# Patient Record
Sex: FEMALE | Race: WHITE | Employment: FULL TIME | URBAN - METROPOLITAN AREA
[De-identification: names, ages, dates, MRNs, and addresses within clinical notes are randomized per-mention and may not be internally consistent; named-entity substitution may affect disease eponyms.]

---

## 2023-01-13 ENCOUNTER — HOSPITAL ENCOUNTER (EMERGENCY)
Age: 28
Discharge: HOME | End: 2023-01-13
Payer: COMMERCIAL

## 2023-01-13 ENCOUNTER — HOSPITAL ENCOUNTER (EMERGENCY)
Age: 28
Discharge: HOME | End: 2023-01-13
Attending: STUDENT IN AN ORGANIZED HEALTH CARE EDUCATION/TRAINING PROGRAM
Payer: COMMERCIAL

## 2023-01-13 ENCOUNTER — APPOINTMENT (OUTPATIENT)
Dept: GENERAL RADIOLOGY | Age: 28
End: 2023-01-13
Payer: COMMERCIAL

## 2023-01-13 VITALS
TEMPERATURE: 98.6 F | WEIGHT: 185 LBS | OXYGEN SATURATION: 100 % | HEART RATE: 77 BPM | BODY MASS INDEX: 29.03 KG/M2 | HEIGHT: 67 IN | RESPIRATION RATE: 20 BRPM | SYSTOLIC BLOOD PRESSURE: 134 MMHG | DIASTOLIC BLOOD PRESSURE: 91 MMHG

## 2023-01-13 DIAGNOSIS — R11.2 NAUSEA AND VOMITING, UNSPECIFIED VOMITING TYPE: Primary | ICD-10-CM

## 2023-01-13 DIAGNOSIS — E86.0 DEHYDRATION: ICD-10-CM

## 2023-01-13 LAB
ALBUMIN SERPL-MCNC: 4.8 G/DL (ref 3.4–5)
ALP BLD-CCNC: 69 U/L (ref 40–129)
ALT SERPL-CCNC: 25 U/L (ref 10–40)
ANION GAP SERPL CALCULATED.3IONS-SCNC: 16 MMOL/L (ref 3–16)
AST SERPL-CCNC: 28 U/L (ref 15–37)
BASOPHILS ABSOLUTE: 0 K/UL (ref 0–0.2)
BASOPHILS RELATIVE PERCENT: 0.2 %
BILIRUB SERPL-MCNC: 1.3 MG/DL (ref 0–1)
BILIRUBIN DIRECT: <0.2 MG/DL (ref 0–0.3)
BILIRUBIN URINE: NEGATIVE
BILIRUBIN, INDIRECT: ABNORMAL MG/DL (ref 0–1)
BLOOD, URINE: NEGATIVE
BUN BLDV-MCNC: 11 MG/DL (ref 7–20)
CALCIUM SERPL-MCNC: 10 MG/DL (ref 8.3–10.6)
CHLORIDE BLD-SCNC: 97 MMOL/L (ref 99–110)
CLARITY: CLEAR
CO2: 20 MMOL/L (ref 21–32)
COLOR: YELLOW
CREAT SERPL-MCNC: 0.7 MG/DL (ref 0.6–1.1)
EOSINOPHILS ABSOLUTE: 0 K/UL (ref 0–0.6)
EOSINOPHILS RELATIVE PERCENT: 0 %
GFR SERPL CREATININE-BSD FRML MDRD: >60 ML/MIN/{1.73_M2}
GLUCOSE BLD-MCNC: 108 MG/DL (ref 70–99)
GLUCOSE URINE: NEGATIVE MG/DL
HCG QUALITATIVE: NEGATIVE
HCT VFR BLD CALC: 42.4 % (ref 36–48)
HEMOGLOBIN: 15.3 G/DL (ref 12–16)
KETONES, URINE: >=80 MG/DL
LACTIC ACID: 1.4 MMOL/L (ref 0.4–2)
LEUKOCYTE ESTERASE, URINE: NEGATIVE
LIPASE: 19 U/L (ref 13–60)
LYMPHOCYTES ABSOLUTE: 1.1 K/UL (ref 1–5.1)
LYMPHOCYTES RELATIVE PERCENT: 10.4 %
MCH RBC QN AUTO: 31.8 PG (ref 26–34)
MCHC RBC AUTO-ENTMCNC: 36.1 G/DL (ref 31–36)
MCV RBC AUTO: 88.2 FL (ref 80–100)
MICROSCOPIC EXAMINATION: ABNORMAL
MONOCYTES ABSOLUTE: 0.4 K/UL (ref 0–1.3)
MONOCYTES RELATIVE PERCENT: 4.3 %
NEUTROPHILS ABSOLUTE: 8.9 K/UL (ref 1.7–7.7)
NEUTROPHILS RELATIVE PERCENT: 85.1 %
NITRITE, URINE: NEGATIVE
PDW BLD-RTO: 13.1 % (ref 12.4–15.4)
PH UA: 7 (ref 5–8)
PLATELET # BLD: 320 K/UL (ref 135–450)
PMV BLD AUTO: 8.1 FL (ref 5–10.5)
POTASSIUM SERPL-SCNC: 3.5 MMOL/L (ref 3.5–5.1)
PROTEIN UA: NEGATIVE MG/DL
RAPID INFLUENZA  B AGN: NEGATIVE
RAPID INFLUENZA A AGN: NEGATIVE
RBC # BLD: 4.8 M/UL (ref 4–5.2)
SARS-COV-2, NAAT: NOT DETECTED
SODIUM BLD-SCNC: 133 MMOL/L (ref 136–145)
SPECIFIC GRAVITY UA: <=1.005 (ref 1–1.03)
TOTAL PROTEIN: 7.6 G/DL (ref 6.4–8.2)
URINE REFLEX TO CULTURE: ABNORMAL
URINE TYPE: ABNORMAL
UROBILINOGEN, URINE: 0.2 E.U./DL
WBC # BLD: 10.5 K/UL (ref 4–11)

## 2023-01-13 PROCEDURE — 84703 CHORIONIC GONADOTROPIN ASSAY: CPT

## 2023-01-13 PROCEDURE — 6360000002 HC RX W HCPCS

## 2023-01-13 PROCEDURE — 6370000000 HC RX 637 (ALT 250 FOR IP)

## 2023-01-13 PROCEDURE — 36415 COLL VENOUS BLD VENIPUNCTURE: CPT

## 2023-01-13 PROCEDURE — 99284 EMERGENCY DEPT VISIT MOD MDM: CPT

## 2023-01-13 PROCEDURE — 83605 ASSAY OF LACTIC ACID: CPT

## 2023-01-13 PROCEDURE — 71045 X-RAY EXAM CHEST 1 VIEW: CPT

## 2023-01-13 PROCEDURE — 83690 ASSAY OF LIPASE: CPT

## 2023-01-13 PROCEDURE — 81003 URINALYSIS AUTO W/O SCOPE: CPT

## 2023-01-13 PROCEDURE — 87804 INFLUENZA ASSAY W/OPTIC: CPT

## 2023-01-13 PROCEDURE — 80076 HEPATIC FUNCTION PANEL: CPT

## 2023-01-13 PROCEDURE — 80048 BASIC METABOLIC PNL TOTAL CA: CPT

## 2023-01-13 PROCEDURE — 96375 TX/PRO/DX INJ NEW DRUG ADDON: CPT

## 2023-01-13 PROCEDURE — 96374 THER/PROPH/DIAG INJ IV PUSH: CPT

## 2023-01-13 PROCEDURE — 85025 COMPLETE CBC W/AUTO DIFF WBC: CPT

## 2023-01-13 PROCEDURE — 87635 SARS-COV-2 COVID-19 AMP PRB: CPT

## 2023-01-13 PROCEDURE — 6360000002 HC RX W HCPCS: Performed by: PHYSICIAN ASSISTANT

## 2023-01-13 RX ORDER — SODIUM CHLORIDE, SODIUM LACTATE, POTASSIUM CHLORIDE, AND CALCIUM CHLORIDE .6; .31; .03; .02 G/100ML; G/100ML; G/100ML; G/100ML
1000 INJECTION, SOLUTION INTRAVENOUS ONCE
Status: DISCONTINUED | OUTPATIENT
Start: 2023-01-13 | End: 2023-01-13 | Stop reason: HOSPADM

## 2023-01-13 RX ORDER — ACETAMINOPHEN 500 MG
1000 TABLET ORAL
Status: COMPLETED | OUTPATIENT
Start: 2023-01-13 | End: 2023-01-13

## 2023-01-13 RX ORDER — PROCHLORPERAZINE EDISYLATE 5 MG/ML
10 INJECTION INTRAMUSCULAR; INTRAVENOUS ONCE
Status: COMPLETED | OUTPATIENT
Start: 2023-01-13 | End: 2023-01-13

## 2023-01-13 RX ORDER — ONDANSETRON 2 MG/ML
4 INJECTION INTRAMUSCULAR; INTRAVENOUS ONCE
Status: COMPLETED | OUTPATIENT
Start: 2023-01-13 | End: 2023-01-13

## 2023-01-13 RX ORDER — 0.9 % SODIUM CHLORIDE 0.9 %
1000 INTRAVENOUS SOLUTION INTRAVENOUS ONCE
Status: DISCONTINUED | OUTPATIENT
Start: 2023-01-13 | End: 2023-01-13 | Stop reason: HOSPADM

## 2023-01-13 RX ORDER — ONDANSETRON 4 MG/1
4 TABLET, FILM COATED ORAL EVERY 8 HOURS PRN
Qty: 20 TABLET | Refills: 0 | Status: SHIPPED | OUTPATIENT
Start: 2023-01-13

## 2023-01-13 RX ORDER — BUPROPION HYDROCHLORIDE 300 MG/1
300 TABLET ORAL EVERY MORNING
COMMUNITY

## 2023-01-13 RX ORDER — HYDROXYZINE HYDROCHLORIDE 10 MG/1
10 TABLET, FILM COATED ORAL ONCE
Status: DISCONTINUED | OUTPATIENT
Start: 2023-01-13 | End: 2023-01-13

## 2023-01-13 RX ADMIN — PROCHLORPERAZINE EDISYLATE 10 MG: 5 INJECTION, SOLUTION INTRAMUSCULAR; INTRAVENOUS at 11:38

## 2023-01-13 RX ADMIN — ONDANSETRON 4 MG: 2 INJECTION INTRAMUSCULAR; INTRAVENOUS at 09:28

## 2023-01-13 RX ADMIN — SODIUM CHLORIDE, SODIUM LACTATE, POTASSIUM CHLORIDE, AND CALCIUM CHLORIDE 1000 ML: .6; .31; .03; .02 INJECTION, SOLUTION INTRAVENOUS at 11:46

## 2023-01-13 RX ADMIN — ACETAMINOPHEN 1000 MG: 500 TABLET ORAL at 11:38

## 2023-01-13 ASSESSMENT — PAIN DESCRIPTION - PAIN TYPE
TYPE: ACUTE PAIN
TYPE: ACUTE PAIN

## 2023-01-13 ASSESSMENT — PAIN DESCRIPTION - LOCATION
LOCATION: ABDOMEN;GENERALIZED
LOCATION: GENERALIZED

## 2023-01-13 ASSESSMENT — PAIN - FUNCTIONAL ASSESSMENT
PAIN_FUNCTIONAL_ASSESSMENT: PREVENTS OR INTERFERES SOME ACTIVE ACTIVITIES AND ADLS
PAIN_FUNCTIONAL_ASSESSMENT: 0-10
PAIN_FUNCTIONAL_ASSESSMENT: PREVENTS OR INTERFERES SOME ACTIVE ACTIVITIES AND ADLS

## 2023-01-13 ASSESSMENT — PAIN DESCRIPTION - DESCRIPTORS
DESCRIPTORS: ACHING;BURNING
DESCRIPTORS: ACHING

## 2023-01-13 ASSESSMENT — PAIN DESCRIPTION - FREQUENCY
FREQUENCY: CONTINUOUS
FREQUENCY: CONTINUOUS

## 2023-01-13 ASSESSMENT — PAIN SCALES - GENERAL
PAINLEVEL_OUTOF10: 9
PAINLEVEL_OUTOF10: 8

## 2023-01-13 ASSESSMENT — PAIN DESCRIPTION - ONSET
ONSET: ON-GOING
ONSET: ON-GOING

## 2023-01-13 NOTE — ED PROVIDER NOTES
[address] [facility]          EM RESIDENT NOTE       Date of evaluation: 1/13/2023    Chief Complaint     Emesis (For 3 days) and Generalized Body Aches      History of Present Illness     Huong Crawford is a previously healthy 32 y.o. female who presents for evaluation of emesis and myalgias. The patient reports that for the past three days she has been experiencing severe nausea and vomiting, to the point that she cannot tolerate any PO intake. She also endorses associated myalgias, diffuse abdominal pain, anxiety, which she says manifests as chest tightness and shortness of breath. She denies lamine chest pain, fevers, chills, hematemesis, diarrhea, constipation, urinary symptoms, and daily marijuana use. MEDICAL DECISION MAKING / ASSESSMENT / PLAN     INITIAL VITALS: BP: (!) 141/101, Temp: 97.8 °F (36.6 °C), Heart Rate: 85, Resp: 20, SpO2: 100 %    Huong Crawford is a 32 y.o. female presenting for evaluation and treatment of nausea and vomiting. She was dry in exam, and reported copious vomiting over the past 3 days. However, her laboratory studies were surprisingly normal, with no white count, normal lactate, and an unremarkable BMP. Lipase and LFTs did not show any gross abnormalities; urinalysis was positive only for ketones. The patient was given Zofran in the waiting room, which did improve her symptoms for approximately 1 hour. However, her nausea and vomiting then recurred. I opted to try her with a dose of IV Compazine, along with 1 L of IV fluids. These medications resulted in a significant improvement. On reassessment, she no longer had any abdominal tenderness, and I did not think that she required abdominal imaging at this time. We did have a long discussion about the possibility of an evolving illness course, with her presentation signifying early appendicitis or other intra-abdominal pathology.   I explained the importance of returning should her symptoms recur or worsen, and she understood. She was able to tolerate p.o., and felt comfortable being discharged home. I suspect that her symptoms are due to a viral gastritis, and that she will continue to improve at home. She was discharged in good condition, with strict return precautions. Medical Decision Making  Amount and/or Complexity of Data Reviewed  Labs: ordered. Decision-making details documented in ED Course. Risk  OTC drugs. Prescription drug management. This patient was also evaluated by the attending physician. All care plans werediscussed and agreed upon. Clinical Impression     1. Nausea and vomiting, unspecified vomiting type    2. Dehydration        Disposition     PATIENT REFERRED TO:  No follow-up provider specified.     DISCHARGE MEDICATIONS:  Discharge Medication List as of 1/13/2023  3:05 PM        START taking these medications    Details   ondansetron (ZOFRAN) 4 MG tablet Take 1 tablet by mouth every 8 hours as needed for Nausea, Disp-20 tablet, R-0Normal             DISPOSITION Decision To Discharge 01/13/2023 03:42:32 PM    Diagnostic Results and Other Data     RADIOLOGY:  No orders to display       LABS:   Results for orders placed or performed during the hospital encounter of 01/13/23   COVID-19, Rapid    Specimen: Nasopharyngeal Swab   Result Value Ref Range    SARS-CoV-2, NAAT Not Detected Not Detected   Rapid Flu Swab    Specimen: Nasopharyngeal   Result Value Ref Range    Rapid Influenza A Ag Negative Negative    Rapid Influenza B Ag Negative Negative   CBC with Auto Differential   Result Value Ref Range    WBC 10.5 4.0 - 11.0 K/uL    RBC 4.80 4.00 - 5.20 M/uL    Hemoglobin 15.3 12.0 - 16.0 g/dL    Hematocrit 42.4 36.0 - 48.0 %    MCV 88.2 80.0 - 100.0 fL    MCH 31.8 26.0 - 34.0 pg    MCHC 36.1 (H) 31.0 - 36.0 g/dL    RDW 13.1 12.4 - 15.4 %    Platelets 216 387 - 121 K/uL    MPV 8.1 5.0 - 10.5 fL    Neutrophils % 85.1 %    Lymphocytes % 10.4 %    Monocytes % 4.3 %    Eosinophils % 0.0 %    Basophils % 0.2 %    Neutrophils Absolute 8.9 (H) 1.7 - 7.7 K/uL    Lymphocytes Absolute 1.1 1.0 - 5.1 K/uL    Monocytes Absolute 0.4 0.0 - 1.3 K/uL    Eosinophils Absolute 0.0 0.0 - 0.6 K/uL    Basophils Absolute 0.0 0.0 - 0.2 K/uL   Basic Metabolic Panel   Result Value Ref Range    Sodium 133 (L) 136 - 145 mmol/L    Potassium 3.5 3.5 - 5.1 mmol/L    Chloride 97 (L) 99 - 110 mmol/L    CO2 20 (L) 21 - 32 mmol/L    Anion Gap 16 3 - 16    Glucose 108 (H) 70 - 99 mg/dL    BUN 11 7 - 20 mg/dL    Creatinine 0.7 0.6 - 1.1 mg/dL    Est, Glom Filt Rate >60 >60    Calcium 10.0 8.3 - 10.6 mg/dL   Lactic Acid   Result Value Ref Range    Lactic Acid 1.4 0.4 - 2.0 mmol/L   Urinalysis with Reflex to Culture    Specimen: Urine, clean catch   Result Value Ref Range    Color, UA Yellow Straw/Yellow    Clarity, UA Clear Clear    Glucose, Ur Negative Negative mg/dL    Bilirubin Urine Negative Negative    Ketones, Urine >=80 (A) Negative mg/dL    Specific Gravity, UA <=1.005 1.005 - 1.030    Blood, Urine Negative Negative    pH, UA 7.0 5.0 - 8.0    Protein, UA Negative Negative mg/dL    Urobilinogen, Urine 0.2 <2.0 E.U./dL    Nitrite, Urine Negative Negative    Leukocyte Esterase, Urine Negative Negative    Microscopic Examination Not Indicated     Urine Type NotGiven     Urine Reflex to Culture Not Indicated    HCG Qualitative, Serum   Result Value Ref Range    hCG Qual Negative Detects HCG level >10 MIU/mL   Lipase   Result Value Ref Range    Lipase 19.0 13.0 - 60.0 U/L   Hepatic Function Panel   Result Value Ref Range    Total Protein 7.6 6.4 - 8.2 g/dL    Albumin 4.8 3.4 - 5.0 g/dL    Alkaline Phosphatase 69 40 - 129 U/L    ALT 25 10 - 40 U/L    AST 28 15 - 37 U/L    Total Bilirubin 1.3 (H) 0.0 - 1.0 mg/dL    Bilirubin, Direct <0.2 0.0 - 0.3 mg/dL    Bilirubin, Indirect see below 0.0 - 1.0 mg/dL     ED BEDSIDE ULTRASOUND:  No results found.     RECENT VITALS:  BP: (!) 134/91, Temp: 98.6 °F (37 °C), Heart Rate: 77,Resp: 20, SpO2: 100 %     ED Course     Nursing Notes, Past Medical Hx, Past Surgical Hx, Social Hx, Allergies, and Family Hx were reviewed. ED Course as of 01/13/23 1801 Fri Jan 13, 2023   1127 SARS-CoV-2, NAAT: Not Detected [ED]   1127 Rapid Influenza A Ag: Negative [ED]   1127 hCG Qual: Negative [ED]   1127 Lactic Acid: 1.4 [ED]   1127 Creatinine: 0.7 [ED]   1127 WBC: 10.5 [ED]   1354 Ketones, Urine(!): >=80 [ED]   1354 Urinalysis with Reflex to Culture(!):    Color, UA Yellow   Clarity, UA Clear   Glucose, UA Negative   Bilirubin, Urine Negative   Ketones, Urine >=80(!)   Specific Gravity, UA <=1.005   Blood, Urine Negative   pH, UA 7.0   Protein, UA Negative   Urobilinogen, Urine 0.2   Nitrite, Urine Negative   Leukocyte Esterase, Urine Negative   Microscopic Examination Not Indicated   Urine Type NotGiven   Urine Reflex to Culture Not Indicated  Negative for UTI [ED]      ED Course User Index  [ED] Vahid Montague MD       The patient was given the following medications:  Orders Placed This Encounter   Medications    ondansetron (ZOFRAN) injection 4 mg    DISCONTD: lactated ringers bolus    acetaminophen (TYLENOL) tablet 1,000 mg    prochlorperazine (COMPAZINE) injection 10 mg    DISCONTD: hydrOXYzine HCl (ATARAX) tablet 10 mg    ondansetron (ZOFRAN) 4 MG tablet     Sig: Take 1 tablet by mouth every 8 hours as needed for Nausea     Dispense:  20 tablet     Refill:  0       CONSULTS:  None    Review of Systems     A 10-point Review of Systems was completed, and, unless otherwise noted in HPI, was negative. Past Medical, Surgical, Family, and Social History     She has a past medical history of Asthma. She has a past surgical history that includes intrauterine device insertion. Her family history includes Other in her father. She reports that she has quit smoking. She does not have any smokeless tobacco history on file. She reports current alcohol use. She reports current drug use. Drug: Marijuana Valeria Ovalle). Medications     Discharge Medication List as of 1/13/2023  3:05 PM        CONTINUE these medications which have NOT CHANGED    Details   buPROPion (WELLBUTRIN XL) 300 MG extended release tablet Take 300 mg by mouth every morningHistorical Med             Allergies     She has No Known Allergies. Physical Exam     INITIAL VITALS: BP: (!) 141/101, Temp: 97.8 °F (36.6 °C), Heart Rate: 85, Resp: 20, SpO2: 100 %   General: Appears stated age, tearful and anxious  Eyes: Pupils reactive. No eye discharge. HENT: Normocephalic and atraumatic. External ears normal. External nose normal. OP clear. Mucous membranes dry. Neck: Supple. Cardiac: Regular rate and rhythm. No murmurs, rubs, or gallops. Pulmonary: Non-labored respiration. Clear to auscultation bilaterally. Abdomen: Soft, nondistended. Diffuse tenderness to palpation, without rebound or guarding. Musculoskeletal: No long bone deformity. Vascular: Normal pulses in all extremities. Skin: Warm, dry. No rashes. Extremities: No peripheral edema. Neuro: Alert. Moves all four extremities to command. No focal deficits.              Gin Jones MD  01/13/23 2518

## 2023-01-13 NOTE — Clinical Note
Toy Wilson was seen and treated in our emergency department on 1/13/2023. She may return to work on 01/16/2023. If you have any questions or concerns, please don't hesitate to call.       Juan J Pabon MD

## 2023-01-13 NOTE — ED PROVIDER NOTES
ED Attending Attestation Note     Date of evaluation: 1/13/2023    This patient was seen by the resident. I have seen and examined the patient, agree with the workup, evaluation, management and diagnosis. The care plan has been discussed. My assessment reveals very well-appearing 42-year-old female presenting with several days of nausea, vomiting and generalized body aches. Patient reports N/V/D for 3 days with poor p.o. intake and decreased urine output with concern for dehydration. Denies associated fevers, chills, dysuria, vaginal symptoms. Reports having diffuse abdominal discomfort after multiple episodes of vomiting. Denies upper respiratory symptoms. Denies known sick contacts. Denies new sexual partners or concern for sexually transmitted infection. On exam, patient is tired appearing in no acute distress and had already received Zofran, Compazine and Tylenol for pain and nausea. Patient with dry mucous membranes, no posterior oropharynx erythema or exudate, clear lungs, strong radial pulse, diffusely tender abdomen with no rebound or guarding. Labs with negative COVID, flu and pregnancy test.  Normal lactic acid and renal function. No leukocytosis. Patient given IV fluids and on repeat assessment reported significant improvement of symptoms and was able to ambulate back and forth to the bathroom. Patient was able to tolerate fluids in the emergency department. Patient had resolution of abdominal pain while in the ER after shared decision making risk-benefit discussion with high suspicion for viral source of GI symptoms, plan to defer CT abdomen and pelvis at this time.   Given the patient was well-appearing with stable vitals and reassuring exam and ability to tolerate p.o., she was discharged home with recommendation to follow-up with her primary doctor on Monday and return to the ER for new or worsening symptoms including worsening vomiting, signs of dehydration, worsening abdominal pain as she may need CT scan at that time or any other symptoms that are concerning to her. She expressed understanding was amenable to plan. All questions were answered prior to discharge.      Wandy Pineda MD  01/13/23 [id number]

## 2023-01-13 NOTE — DISCHARGE INSTRUCTIONS
You were seen in the emergency department with nausea and vomiting, which we think is likely due to a viral gastritis. Fortunately, we were able to improve your symptoms with IV fluids and anti-nausea medication, and we think you are safe to go home at this time. We were pleased that your abdominal pain resolved with these treatments, and we do not think you require a CT scan at this time. However, if your abdominal pain returns, worsens, or moves to one specific place in your belly, please return to the emergency department for further evaluation. Please also return for any repeat nausea/vomiting, difficulty breathing, or chest pain.

## 2024-02-16 ENCOUNTER — OFFICE VISIT (OUTPATIENT)
Dept: GYNECOLOGY | Age: 29
End: 2024-02-16

## 2024-02-16 VITALS — BODY MASS INDEX: 29.82 KG/M2 | HEIGHT: 67 IN | WEIGHT: 190 LBS

## 2024-02-16 DIAGNOSIS — T83.32XA MALPOSITIONED INTRAUTERINE DEVICE (IUD), INITIAL ENCOUNTER: ICD-10-CM

## 2024-02-16 DIAGNOSIS — Z30.09 FAMILY PLANNING: ICD-10-CM

## 2024-02-16 DIAGNOSIS — Z01.419 WELL WOMAN EXAM WITH ROUTINE GYNECOLOGICAL EXAM: Primary | ICD-10-CM

## 2024-02-16 DIAGNOSIS — N92.6 CATAMENIAL DISORDER: ICD-10-CM

## 2024-02-16 RX ORDER — PROMETHAZINE HYDROCHLORIDE 25 MG/1
TABLET ORAL
Qty: 2 TABLET | Refills: 0 | Status: SHIPPED | OUTPATIENT
Start: 2024-02-16

## 2024-02-16 RX ORDER — DIAZEPAM 10 MG/1
TABLET ORAL
Qty: 1 TABLET | Refills: 0 | Status: SHIPPED | OUTPATIENT
Start: 2024-02-16 | End: 2024-02-16

## 2024-02-16 RX ORDER — OXYCODONE HYDROCHLORIDE AND ACETAMINOPHEN 5; 325 MG/1; MG/1
2 TABLET ORAL ONCE
Qty: 2 TABLET | Refills: 0 | Status: SHIPPED | OUTPATIENT
Start: 2024-02-16 | End: 2024-02-16

## 2024-02-16 NOTE — PROGRESS NOTES
The sensitive parts of the examination were performed with Marii Castro CMA as a chaperone.  Marii was present during the entirety of the sensitive parts of the examination.      Marii Castro MA

## 2024-02-16 NOTE — PROGRESS NOTES
SUBJECTIVE:  Latasha Branch is an 28 y.o. year old woman who presents for annual gyn exam.    Patient is currently on her second Mirena IUD.  She has been very happy with it.  No plans for childbearing.  She would like to get another 1.  Other options are reviewed.  She reports that she is 5 years into her current IUD.  She reports that she is starting to have some abnormal bleeding with it with some light bleeding every couple weeks.    REVIEW OF SYSTEMS:  No complaints of symptoms involving:  Constitutional: there has been no unanticipated weight loss. There's been no change in activity level. Negative for fever, chills.  Eyes: No visual changes, double vision, or scotomata. No scleral icterus.  HENT: No Headaches, hearing loss or vertigo. No sore throat, ear pain or nasal congestion  Respiratory: no cough or wheezing, no sputum production, no hemoptysis.    Gastrointestinal: No abdominal pain, appetite loss, blood in stools. No change in bowel habits.  Genitourinary: No dysuria, trouble voiding, or hematuria. No change in bladder habits.  Musculoskeletal:  No gait disturbance,no weakness or joint complaints.  Skin: No rash or pruritis.  Neurological: No headache, vision changes, change in muscle strength, numbness or tingling. No change in gait, balance, coordination.  Psychiatric: No anxiety, or depression. No change in mood or behavior.  Endocrine: No temperature intolerance. No excessive thirst, fluid intake, or urination. No tremor.  Hematologic/Lymphatic: No abnormal bruising or bleeding, blood clots or swollen lymph nodes.       Patient Active Problem List   Diagnosis    Pyelonephritis    Leucocytosis    Acute pyelonephritis    Headache    Recurrent UTI    Hyperglycemia    Sepsis (HCC)     Current Outpatient Medications   Medication Sig Dispense Refill    oxyCODONE-acetaminophen (PERCOCET) 5-325 MG per tablet Take 2 tablets by mouth once for 1 dose. Take 2 tablets 60 minutes before procedure. Max Daily

## 2024-02-19 ENCOUNTER — OFFICE VISIT (OUTPATIENT)
Dept: INTERNAL MEDICINE CLINIC | Age: 29
End: 2024-02-19
Payer: COMMERCIAL

## 2024-02-19 VITALS
HEIGHT: 67 IN | DIASTOLIC BLOOD PRESSURE: 76 MMHG | WEIGHT: 208 LBS | SYSTOLIC BLOOD PRESSURE: 110 MMHG | HEART RATE: 71 BPM | BODY MASS INDEX: 32.65 KG/M2 | TEMPERATURE: 98.1 F | OXYGEN SATURATION: 98 %

## 2024-02-19 DIAGNOSIS — R63.5 WEIGHT GAIN: ICD-10-CM

## 2024-02-19 DIAGNOSIS — F32.A DEPRESSION, UNSPECIFIED DEPRESSION TYPE: ICD-10-CM

## 2024-02-19 DIAGNOSIS — Z72.89 VAPES NON-NICOTINE CONTAINING SUBSTANCE: ICD-10-CM

## 2024-02-19 DIAGNOSIS — Z00.00 ANNUAL PHYSICAL EXAM: Primary | ICD-10-CM

## 2024-02-19 DIAGNOSIS — Z00.00 ANNUAL PHYSICAL EXAM: ICD-10-CM

## 2024-02-19 LAB
25(OH)D3 SERPL-MCNC: 25.1 NG/ML
ALBUMIN SERPL-MCNC: 4.8 G/DL (ref 3.4–5)
ALBUMIN/GLOB SERPL: 2.1 {RATIO} (ref 1.1–2.2)
ALP SERPL-CCNC: 93 U/L (ref 40–129)
ALT SERPL-CCNC: 15 U/L (ref 10–40)
ANION GAP SERPL CALCULATED.3IONS-SCNC: 9 MMOL/L (ref 3–16)
AST SERPL-CCNC: 17 U/L (ref 15–37)
BASOPHILS # BLD: 0.1 K/UL (ref 0–0.2)
BASOPHILS NFR BLD: 1.2 %
BILIRUB SERPL-MCNC: 0.9 MG/DL (ref 0–1)
BUN SERPL-MCNC: 12 MG/DL (ref 7–20)
CALCIUM SERPL-MCNC: 8.9 MG/DL (ref 8.3–10.6)
CHLORIDE SERPL-SCNC: 104 MMOL/L (ref 99–110)
CHOLEST SERPL-MCNC: 199 MG/DL (ref 0–199)
CO2 SERPL-SCNC: 26 MMOL/L (ref 21–32)
CREAT SERPL-MCNC: 0.9 MG/DL (ref 0.6–1.1)
DEPRECATED RDW RBC AUTO: 13.6 % (ref 12.4–15.4)
EOSINOPHIL # BLD: 0.2 K/UL (ref 0–0.6)
EOSINOPHIL NFR BLD: 2.9 %
GFR SERPLBLD CREATININE-BSD FMLA CKD-EPI: >60 ML/MIN/{1.73_M2}
GLUCOSE SERPL-MCNC: 90 MG/DL (ref 70–99)
HCT VFR BLD AUTO: 41.9 % (ref 36–48)
HCV AB SERPL QL IA: NORMAL
HDLC SERPL-MCNC: 65 MG/DL (ref 40–60)
HGB BLD-MCNC: 14.5 G/DL (ref 12–16)
LDL CHOLESTEROL CALCULATED: 123 MG/DL
LYMPHOCYTES # BLD: 1.6 K/UL (ref 1–5.1)
LYMPHOCYTES NFR BLD: 30 %
MCH RBC QN AUTO: 31 PG (ref 26–34)
MCHC RBC AUTO-ENTMCNC: 34.5 G/DL (ref 31–36)
MCV RBC AUTO: 89.8 FL (ref 80–100)
MONOCYTES # BLD: 0.3 K/UL (ref 0–1.3)
MONOCYTES NFR BLD: 5 %
NEUTROPHILS # BLD: 3.3 K/UL (ref 1.7–7.7)
NEUTROPHILS NFR BLD: 60.9 %
PLATELET # BLD AUTO: 242 K/UL (ref 135–450)
PMV BLD AUTO: 8.9 FL (ref 5–10.5)
POTASSIUM SERPL-SCNC: 4.4 MMOL/L (ref 3.5–5.1)
PROT SERPL-MCNC: 7.1 G/DL (ref 6.4–8.2)
RBC # BLD AUTO: 4.66 M/UL (ref 4–5.2)
SODIUM SERPL-SCNC: 139 MMOL/L (ref 136–145)
TRIGL SERPL-MCNC: 54 MG/DL (ref 0–150)
TSH SERPL DL<=0.005 MIU/L-ACNC: 1.77 UIU/ML (ref 0.27–4.2)
VLDLC SERPL CALC-MCNC: 11 MG/DL
WBC # BLD AUTO: 5.4 K/UL (ref 4–11)

## 2024-02-19 PROCEDURE — 99204 OFFICE O/P NEW MOD 45 MIN: CPT | Performed by: STUDENT IN AN ORGANIZED HEALTH CARE EDUCATION/TRAINING PROGRAM

## 2024-02-19 SDOH — ECONOMIC STABILITY: FOOD INSECURITY: WITHIN THE PAST 12 MONTHS, THE FOOD YOU BOUGHT JUST DIDN'T LAST AND YOU DIDN'T HAVE MONEY TO GET MORE.: NEVER TRUE

## 2024-02-19 SDOH — ECONOMIC STABILITY: FOOD INSECURITY: WITHIN THE PAST 12 MONTHS, YOU WORRIED THAT YOUR FOOD WOULD RUN OUT BEFORE YOU GOT MONEY TO BUY MORE.: NEVER TRUE

## 2024-02-19 SDOH — ECONOMIC STABILITY: INCOME INSECURITY: HOW HARD IS IT FOR YOU TO PAY FOR THE VERY BASICS LIKE FOOD, HOUSING, MEDICAL CARE, AND HEATING?: NOT HARD AT ALL

## 2024-02-19 SDOH — ECONOMIC STABILITY: HOUSING INSECURITY
IN THE LAST 12 MONTHS, WAS THERE A TIME WHEN YOU DID NOT HAVE A STEADY PLACE TO SLEEP OR SLEPT IN A SHELTER (INCLUDING NOW)?: NO

## 2024-02-19 ASSESSMENT — PATIENT HEALTH QUESTIONNAIRE - PHQ9
SUM OF ALL RESPONSES TO PHQ QUESTIONS 1-9: 0
SUM OF ALL RESPONSES TO PHQ9 QUESTIONS 1 & 2: 0
1. LITTLE INTEREST OR PLEASURE IN DOING THINGS: 0
2. FEELING DOWN, DEPRESSED OR HOPELESS: 0
SUM OF ALL RESPONSES TO PHQ QUESTIONS 1-9: 0

## 2024-02-19 ASSESSMENT — ENCOUNTER SYMPTOMS
NAUSEA: 0
COUGH: 0
SHORTNESS OF BREATH: 0
ABDOMINAL PAIN: 0
VOMITING: 0
DIARRHEA: 0
SINUS PRESSURE: 0
CONSTIPATION: 0
SINUS PAIN: 0

## 2024-02-19 NOTE — ASSESSMENT & PLAN NOTE
Patient vapes marijuana daily.  - Discussed with patient the importance of quitting, as well as the risks of continuing such as vape induced lung injury

## 2024-02-19 NOTE — ASSESSMENT & PLAN NOTE
Patient complains of difficulty losing weight since September 2023.  Has been intermittent fasting, dieting with calorie deficit and exercising vigorously on a daily basis, and she has lost 4 pounds in 2 months.  She is discouraged with her lack of progress and feels she had a much easier time losing weight on Wellbutrin.  - We discussed the possibility of seeing weight management doctor  - Will check labs today including TSH

## 2024-02-19 NOTE — ASSESSMENT & PLAN NOTE
Mood is stable.  Patient has been intermittently on Wellbutrin in the past.  She has been off of Wellbutrin since June 2023.  - Continue to monitor  - Patient assures me there is no need for treatment-not medical nor therapy-- at this time

## 2024-02-19 NOTE — PROGRESS NOTES
2024    Latasha Branch (:  1995) is a 28 y.o. female, here for evaluation of the following medical concerns:    Chief Complaint   Patient presents with    New Patient        HPI    Patient here to establish care with PCP. Last PCP visit was during [facility]. She reports having had a previous diagnosis of marijuana hyperemesis. She cut back on smoking marijuana and has been doing better.     Patient stopped wellbutrin in 2023. Since Sep 2023 patient feels like she barely eats anything and gains weight so easily. Patient has been dieting and exercising x 2 months (Oct-Dec eating well, exercising everyday) and she only lost 2 lbs. She has taken Wellbutrin in the past for depression and it has helped her lose weight.     LMP 24. She is using mirena, has been getting period irregularly lately because her obgyn says her mirena is 'falling out.'      Review of Systems   Constitutional:  Negative for chills, fatigue and fever.   HENT:  Negative for congestion, sinus pressure and sinus pain.    Respiratory:  Negative for cough and shortness of breath.    Cardiovascular:  Negative for chest pain and leg swelling.   Gastrointestinal:  Negative for abdominal pain, constipation, diarrhea, nausea and vomiting.   Genitourinary:  Negative for difficulty urinating, vaginal bleeding and vaginal discharge.   Neurological:  Negative for dizziness and syncope.   Psychiatric/Behavioral:  Negative for dysphoric mood.        Prior to Visit Medications    Not on File        No Known Allergies    Past Medical History:   Diagnosis Date    Asthma        Past Surgical History:   Procedure Laterality Date    INTRAUTERINE DEVICE INSERTION         Social History     Socioeconomic History    Marital status: Single     Spouse name: Not on file    Number of children: 0    Years of education: Not on file    Highest education level: Not on file   Occupational History    Not on file   Tobacco Use    Smoking status: Former

## 2024-02-20 LAB
EST. AVERAGE GLUCOSE BLD GHB EST-MCNC: 73.8 MG/DL
HBA1C MFR BLD: 4.2 %
HIV 1+2 AB+HIV1 P24 AG SERPL QL IA: NORMAL
HIV 2 AB SERPL QL IA: NORMAL
HIV1 AB SERPL QL IA: NORMAL
HIV1 P24 AG SERPL QL IA: NORMAL
HPV HR 12 DNA SPEC QL NAA+PROBE: NOT DETECTED
HPV16 DNA SPEC QL NAA+PROBE: NOT DETECTED
HPV16+18+H RISK 12 DNA SPEC-IMP: NORMAL
HPV18 DNA SPEC QL NAA+PROBE: NOT DETECTED

## 2024-02-21 ENCOUNTER — TELEPHONE (OUTPATIENT)
Dept: GYNECOLOGY | Age: 29
End: 2024-02-21

## 2024-02-21 NOTE — TELEPHONE ENCOUNTER
Patient called and said she was told that her IUD would be shipped to the office on this upcoming Monday February 26 th. Patient would like an appointment scheduled. Informed her that a message would be put into Dr Ramon staff and they would contact her to schedule appointment once IUD arrives in office     Patient can be contacted at [phone]

## 2024-02-22 ENCOUNTER — TELEPHONE (OUTPATIENT)
Dept: INTERNAL MEDICINE CLINIC | Age: 29
End: 2024-02-22

## 2024-02-22 DIAGNOSIS — Z76.89 ENCOUNTER FOR WEIGHT MANAGEMENT: Primary | ICD-10-CM

## 2024-02-22 DIAGNOSIS — R63.5 WEIGHT GAIN: Primary | ICD-10-CM

## 2024-02-22 NOTE — TELEPHONE ENCOUNTER
I called the patient to discuss her concerns as we proceed with treatment.  She would also like to have a referral for weight management, can we send her a referral for Dr. Baltazar for weight management with [facility]. Thanks!

## 2024-02-22 NOTE — TELEPHONE ENCOUNTER
Called patient to follow-up concerns about weight loss despite rigorous diet and exercise.  She would like to proceed with GLP-1 treatment.  I discussed risks and benefits of therapy with patient.     Discussed that the patient will need to be monitored for weight, BP, HR q6 weeks. If after 12 wks at MAX tolerated dose patients don't lose 4-5% body weight, drug should be tapered down and stopped.    Discussed major adverse effects which include gastrointestinal, including nausea, diarrhea, and vomiting.  These typically improve with time.    She denies any possibility of being pregnant or any personal history of pancreatitis, heart failure or thyroid cancer.    Sent 0.25 mg weekly semaglutide to pharmacy on file.  Patient will follow-up with us within a month or sooner if needed.

## 2024-02-22 NOTE — TELEPHONE ENCOUNTER
----- Message from Allyson Roshuber Weber sent at 2/21/2024  3:15 PM EST -----  Subject: Message to Provider    QUESTIONS  Information for Provider? Pt is wanting help with weight management. She   has been eating healthy and exercising, and not losing any weight. She   spoke with Dr. Andrews yesterday and didn't get any help. She felt like her   concerns where dismissed yesterday. Can she be referred to someone for   weight management.  ---------------------------------------------------------------------------  --------------  CALL BACK INFO  [phone]; OK to leave message on voicemail  ---------------------------------------------------------------------------  --------------  SCRIPT ANSWERS  Relationship to Patient? Self

## 2024-03-01 ENCOUNTER — PROCEDURE VISIT (OUTPATIENT)
Dept: GYNECOLOGY | Age: 29
End: 2024-03-01

## 2024-03-01 VITALS — HEIGHT: 67 IN | BODY MASS INDEX: 32.65 KG/M2 | WEIGHT: 208 LBS

## 2024-03-01 DIAGNOSIS — T83.32XA MALPOSITIONED INTRAUTERINE DEVICE (IUD), INITIAL ENCOUNTER: Primary | ICD-10-CM

## 2024-03-01 DIAGNOSIS — Z30.433 ENCOUNTER FOR REMOVAL AND REINSERTION OF INTRAUTERINE CONTRACEPTIVE DEVICE (IUD): ICD-10-CM

## 2024-03-01 DIAGNOSIS — Z97.5 PRESENCE OF 52 MG LEVONORGESTREL-RELEASING INTRAUTERINE DEVICE (IUD): ICD-10-CM

## 2024-03-01 LAB
CONTROL: PRESENT
PREGNANCY TEST URINE, POC: NEGATIVE

## 2024-03-01 NOTE — PROGRESS NOTES
soft, non-tender. No masses, organomegaly, hernia  Vulva:  no external lesions,normal hair distribution,no inguinal adenopathy  Vagina:  moist,pink,well rugated,no discharge  Bladder, Urethra, and Urethral meatus grossly normal without mass, nontender.  Cervix:  parous,smooth,pink,no lesions.  Uterus:  normal size, shape and consistency,mobile,nontender  Adnexa:  no masses,no tenderness  Rectum:  no external hemorrhoids,no lesions    PROCEDURE:    IUD removal:  With the patient comfortably in lithotomy position, a bimanual exam was performed to establish the size, shape and position of the uterus.     A speculum was gently inserted into the vagina to visualize the cervix.     Mirena was removed by applying gentle traction on the threads with forceps.  The device was withdrawn without complication, shown to the patient, and disposed of.    Mirena IUD insertion:  Pregnancy has been excluded and there are no other contraindications to the use of Mirena.   We discussed and she understands the contents of the Patient Information Booklet and a signed patient informed consent has been obtained.   With the patient comfortably in lithotomy position, a bimanual exam was previously performed to establish the size, shape and position of the uterus.  See above.  A speculum was previously gently inserted into the vagina to visualize the cervix.  See above.  The cervix and vagina was gently cleansed with antiseptic solution.   The anterior lip of the cervix was grasped with a Allis forceps and gently traction was applied to stabilize and align the cervical canal with the uterine cavity. Gentle traction on the cervix was maintained throughout the insertion procedure.   A uterine sound was gently inserted to check the patency of the cervix, measure the depth of the uterine cavity in centimeters, confirm cavity direction, and detect evaluate for the presence of uterine anomaly.  The Mirena IUD packaging is opened.    The handle of

## 2024-03-08 ENCOUNTER — OFFICE VISIT (OUTPATIENT)
Dept: GYNECOLOGY | Age: 29
End: 2024-03-08
Payer: COMMERCIAL

## 2024-03-08 VITALS
SYSTOLIC BLOOD PRESSURE: 124 MMHG | OXYGEN SATURATION: 99 % | BODY MASS INDEX: 32.58 KG/M2 | DIASTOLIC BLOOD PRESSURE: 82 MMHG | WEIGHT: 208 LBS | HEART RATE: 75 BPM

## 2024-03-08 DIAGNOSIS — Z97.5 PRESENCE OF 52 MG LEVONORGESTREL-RELEASING INTRAUTERINE DEVICE (IUD): Primary | ICD-10-CM

## 2024-03-08 PROCEDURE — 99213 OFFICE O/P EST LOW 20 MIN: CPT | Performed by: OBSTETRICS & GYNECOLOGY

## 2024-03-08 NOTE — PROGRESS NOTES
Latasha Branch is a 28 y.o. female who presents for evaluation after insertion of IUD.    Patient reports she did well after insertion.  She reports the last 2 to 3 days she has had some increased cramping.  She has used some Tylenol for this.    Latasha denies any fever, abdominal or pelvic pain, nausea or vomiting.  She denies dysuria or changes in normal bowel habits.    She does report spotting and we again discussed what her bleeding pattern may be over the next 3-6 months.   She denies other problems or complaints.    Review of systems:  No complaints of symptoms involving:  Constitutional: negative for fever, chills.  Eyes: No change in vision, double vision, or scotomata.  HENT: No sore throat, ear pain or nasal congestion.  Respiratory: No cough, shortness of breath or hemoptysis.  Cardiovascular: No chest pain, orthopnea, fainting.  Skin: No pruritus or generalized rash.  Neurologic: No focal weakness or sensory changes.   Gynecologic: see HPI    ALLERGIES:  Patient has no known allergies.    OB History          0    Para   0    Term   0       0    AB   0    Living   0         SAB   0    IAB   0    Ectopic   0    Molar   0    Multiple   0    Live Births   0                Past medical history, past surgical history, social history, and family history are updated in the electronic medical record and reviewed.    Past Medical History:   Diagnosis Date    Asthma      Past Surgical History:   Procedure Laterality Date    INTRAUTERINE DEVICE INSERTION       Social History     Tobacco Use    Smoking status: Former     Types: Cigarettes    Smokeless tobacco: Never   Substance Use Topics    Alcohol use: Yes     Family History   Problem Relation Age of Onset    Other Father         hemolytic anemia    Lung Cancer Maternal Grandfather     Pancreatic Cancer Paternal Grandfather        OBJECTIVE:  /82 (Site: Left Upper Arm, Position: Sitting, Cuff Size: Medium Adult)   Pulse 75   Wt 94.3 kg (208

## 2024-03-20 PROBLEM — Z00.00 ANNUAL PHYSICAL EXAM: Status: RESOLVED | Noted: 2024-02-19 | Resolved: 2024-03-20

## 2024-04-26 ENCOUNTER — HOSPITAL ENCOUNTER (EMERGENCY)
Age: 29
Discharge: HOME | End: 2024-04-26
Attending: EMERGENCY MEDICINE
Payer: COMMERCIAL

## 2024-04-26 VITALS
TEMPERATURE: 98.1 F | SYSTOLIC BLOOD PRESSURE: 150 MMHG | HEART RATE: 72 BPM | BODY MASS INDEX: 30.61 KG/M2 | HEIGHT: 67 IN | OXYGEN SATURATION: 100 % | DIASTOLIC BLOOD PRESSURE: 88 MMHG | RESPIRATION RATE: 19 BRPM | WEIGHT: 195 LBS

## 2024-04-26 DIAGNOSIS — R11.2 NAUSEA AND VOMITING, UNSPECIFIED VOMITING TYPE: Primary | ICD-10-CM

## 2024-04-26 LAB
ALBUMIN SERPL-MCNC: 4.5 G/DL (ref 3.4–5)
ALP SERPL-CCNC: 58 U/L (ref 40–129)
ALT SERPL-CCNC: 14 U/L (ref 10–40)
AMORPH SED URNS QL MICRO: ABNORMAL /HPF
ANION GAP SERPL CALCULATED.3IONS-SCNC: 13 MMOL/L (ref 3–16)
AST SERPL-CCNC: 17 U/L (ref 15–37)
BACTERIA URNS QL MICRO: ABNORMAL /HPF
BASOPHILS # BLD: 0.1 K/UL (ref 0–0.2)
BASOPHILS NFR BLD: 0.9 %
BILIRUB DIRECT SERPL-MCNC: 0.3 MG/DL (ref 0–0.3)
BILIRUB INDIRECT SERPL-MCNC: 2 MG/DL (ref 0–1)
BILIRUB SERPL-MCNC: 2.3 MG/DL (ref 0–1)
BILIRUB UR QL STRIP.AUTO: ABNORMAL
BUN SERPL-MCNC: 8 MG/DL (ref 7–20)
CALCIUM SERPL-MCNC: 9.1 MG/DL (ref 8.3–10.6)
CHLORIDE SERPL-SCNC: 103 MMOL/L (ref 99–110)
CLARITY UR: CLEAR
CO2 SERPL-SCNC: 19 MMOL/L (ref 21–32)
COLOR UR: YELLOW
CREAT SERPL-MCNC: 0.8 MG/DL (ref 0.6–1.1)
DEPRECATED RDW RBC AUTO: 13.6 % (ref 12.4–15.4)
EOSINOPHIL # BLD: 0.1 K/UL (ref 0–0.6)
EOSINOPHIL NFR BLD: 0.6 %
EPI CELLS #/AREA URNS HPF: ABNORMAL /HPF (ref 0–5)
GFR SERPLBLD CREATININE-BSD FMLA CKD-EPI: >90 ML/MIN/{1.73_M2}
GLUCOSE SERPL-MCNC: 110 MG/DL (ref 70–99)
GLUCOSE UR STRIP.AUTO-MCNC: NEGATIVE MG/DL
HCG SERPL QL: NEGATIVE
HCT VFR BLD AUTO: 43.6 % (ref 36–48)
HGB BLD-MCNC: 15.3 G/DL (ref 12–16)
HGB UR QL STRIP.AUTO: NEGATIVE
KETONES UR STRIP.AUTO-MCNC: 40 MG/DL
LEUKOCYTE ESTERASE UR QL STRIP.AUTO: ABNORMAL
LIPASE SERPL-CCNC: 23 U/L (ref 13–60)
LYMPHOCYTES # BLD: 3.1 K/UL (ref 1–5.1)
LYMPHOCYTES NFR BLD: 30.8 %
MCH RBC QN AUTO: 31.1 PG (ref 26–34)
MCHC RBC AUTO-ENTMCNC: 35.2 G/DL (ref 31–36)
MCV RBC AUTO: 88.5 FL (ref 80–100)
MONOCYTES # BLD: 0.5 K/UL (ref 0–1.3)
MONOCYTES NFR BLD: 5.3 %
NEUTROPHILS # BLD: 6.3 K/UL (ref 1.7–7.7)
NEUTROPHILS NFR BLD: 62.4 %
NITRITE UR QL STRIP.AUTO: NEGATIVE
PH UR STRIP.AUTO: 6 [PH] (ref 5–8)
PLATELET # BLD AUTO: 321 K/UL (ref 135–450)
PMV BLD AUTO: 7.5 FL (ref 5–10.5)
POTASSIUM SERPL-SCNC: 3.3 MMOL/L (ref 3.5–5.1)
PROT SERPL-MCNC: 7.2 G/DL (ref 6.4–8.2)
PROT UR STRIP.AUTO-MCNC: 30 MG/DL
RBC # BLD AUTO: 4.93 M/UL (ref 4–5.2)
RBC #/AREA URNS HPF: ABNORMAL /HPF (ref 0–4)
RENAL EPI CELLS #/AREA UR COMP ASSIST: ABNORMAL /HPF (ref 0–1)
SODIUM SERPL-SCNC: 135 MMOL/L (ref 136–145)
SP GR UR STRIP.AUTO: >=1.03 (ref 1–1.03)
UA DIPSTICK W REFLEX MICRO PNL UR: YES
URN SPEC COLLECT METH UR: ABNORMAL
UROBILINOGEN UR STRIP-ACNC: 0.2 E.U./DL
WBC # BLD AUTO: 10.1 K/UL (ref 4–11)
WBC #/AREA URNS HPF: ABNORMAL /HPF (ref 0–5)

## 2024-04-26 PROCEDURE — 96375 TX/PRO/DX INJ NEW DRUG ADDON: CPT

## 2024-04-26 PROCEDURE — 96374 THER/PROPH/DIAG INJ IV PUSH: CPT

## 2024-04-26 PROCEDURE — 6370000000 HC RX 637 (ALT 250 FOR IP): Performed by: EMERGENCY MEDICINE

## 2024-04-26 PROCEDURE — 84703 CHORIONIC GONADOTROPIN ASSAY: CPT

## 2024-04-26 PROCEDURE — 99284 EMERGENCY DEPT VISIT MOD MDM: CPT

## 2024-04-26 PROCEDURE — 2580000003 HC RX 258: Performed by: EMERGENCY MEDICINE

## 2024-04-26 PROCEDURE — 6360000002 HC RX W HCPCS: Performed by: EMERGENCY MEDICINE

## 2024-04-26 PROCEDURE — 81001 URINALYSIS AUTO W/SCOPE: CPT

## 2024-04-26 PROCEDURE — 80048 BASIC METABOLIC PNL TOTAL CA: CPT

## 2024-04-26 PROCEDURE — 80076 HEPATIC FUNCTION PANEL: CPT

## 2024-04-26 PROCEDURE — 96361 HYDRATE IV INFUSION ADD-ON: CPT

## 2024-04-26 PROCEDURE — 85025 COMPLETE CBC W/AUTO DIFF WBC: CPT

## 2024-04-26 PROCEDURE — 36415 COLL VENOUS BLD VENIPUNCTURE: CPT

## 2024-04-26 PROCEDURE — 83690 ASSAY OF LIPASE: CPT

## 2024-04-26 RX ORDER — DIPHENHYDRAMINE HYDROCHLORIDE 50 MG/ML
25 INJECTION INTRAMUSCULAR; INTRAVENOUS ONCE
Status: COMPLETED | OUTPATIENT
Start: 2024-04-26 | End: 2024-04-26

## 2024-04-26 RX ORDER — DROPERIDOL 2.5 MG/ML
2.5 INJECTION, SOLUTION INTRAMUSCULAR; INTRAVENOUS ONCE
Status: COMPLETED | OUTPATIENT
Start: 2024-04-26 | End: 2024-04-26

## 2024-04-26 RX ORDER — SODIUM CHLORIDE, SODIUM LACTATE, POTASSIUM CHLORIDE, AND CALCIUM CHLORIDE .6; .31; .03; .02 G/100ML; G/100ML; G/100ML; G/100ML
1000 INJECTION, SOLUTION INTRAVENOUS ONCE
Status: COMPLETED | OUTPATIENT
Start: 2024-04-26 | End: 2024-04-26

## 2024-04-26 RX ADMIN — SODIUM CHLORIDE, POTASSIUM CHLORIDE, SODIUM LACTATE AND CALCIUM CHLORIDE 1000 ML: 600; 310; 30; 20 INJECTION, SOLUTION INTRAVENOUS at 06:32

## 2024-04-26 RX ADMIN — DROPERIDOL 2.5 MG: 2.5 INJECTION, SOLUTION INTRAMUSCULAR; INTRAVENOUS at 06:33

## 2024-04-26 RX ADMIN — POTASSIUM BICARBONATE 40 MEQ: 782 TABLET, EFFERVESCENT ORAL at 07:17

## 2024-04-26 RX ADMIN — DIPHENHYDRAMINE HYDROCHLORIDE 25 MG: 50 INJECTION INTRAMUSCULAR; INTRAVENOUS at 07:08

## 2024-04-26 ASSESSMENT — ENCOUNTER SYMPTOMS
RESPIRATORY NEGATIVE: 1
EYES NEGATIVE: 1
ABDOMINAL PAIN: 1
VOMITING: 1
NAUSEA: 1
CONSTIPATION: 0
DIARRHEA: 0

## 2024-04-26 ASSESSMENT — PAIN - FUNCTIONAL ASSESSMENT: PAIN_FUNCTIONAL_ASSESSMENT: 0-10

## 2024-04-26 ASSESSMENT — PAIN DESCRIPTION - DESCRIPTORS: DESCRIPTORS: ACHING

## 2024-04-26 ASSESSMENT — PAIN DESCRIPTION - LOCATION: LOCATION: GENERALIZED

## 2024-04-26 ASSESSMENT — PAIN SCALES - GENERAL: PAINLEVEL_OUTOF10: 9

## 2024-04-26 NOTE — ED PROVIDER NOTES
but no focal tenderness and no rebound or guarding.  Laboratory studies are pending.  Patient was written for droperidol and IV fluids.  At this time, care of the patient be turned over to the oncoming provider who complete the patient's workup and disposition pending results of testing and response to therapy.    Is this patient to be included in the SEP-1 core measure? No Exclusion criteria - the patient is NOT to be included for SEP-1 Core Measure due to: Infection is not suspected    Medical Decision Making  Amount and/or Complexity of Data Reviewed  Labs: ordered.        Clinical Impression     No diagnosis found.    Disposition     PATIENT REFERRED TO:  No follow-up provider specified.    DISCHARGE MEDICATIONS:  New Prescriptions    No medications on file       DISPOSITION          Diagnostic Results and Other Data       RADIOLOGY:  No orders to display       LABS:   No results found for this visit on 04/26/24.      ED BEDSIDE ULTRASOUND:  No results found.    MOST RECENT VITALS:  BP: (!) 170/116,Temp: 98.1 °F (36.7 °C), Pulse: 74, Respirations: 22, SpO2: 98 %     Procedures     N/A    ED Course     Nursing Notes, Past Medical Hx, Past Surgical Hx, Social Hx,Allergies, and Family Hx were reviewed.         The patient was given the following medications:  Orders Placed This Encounter   Medications    droPERidol (INAPSINE) injection 2.5 mg    lactated ringers bolus 1,000 mL       CONSULTS:  None    Review of Systems     Review of Systems   Constitutional: Negative.    HENT: Negative.     Eyes: Negative.    Respiratory: Negative.     Cardiovascular: Negative.    Gastrointestinal:  Positive for abdominal pain, nausea and vomiting. Negative for constipation and diarrhea.   Genitourinary: Negative.    Musculoskeletal: Negative.    Neurological: Negative.    All other systems reviewed and are negative.      Past Medical, Surgical, Family, and Social History     She has a past medical history of Asthma.  She has a 
effervescent tablet 40 mEq       CONSULTS:  None       Jake Beasley MD  04/26/24 0753